# Patient Record
Sex: FEMALE
[De-identification: names, ages, dates, MRNs, and addresses within clinical notes are randomized per-mention and may not be internally consistent; named-entity substitution may affect disease eponyms.]

---

## 2023-03-29 ENCOUNTER — APPOINTMENT (OUTPATIENT)
Dept: OTOLARYNGOLOGY | Facility: CLINIC | Age: 23
End: 2023-03-29
Payer: COMMERCIAL

## 2023-03-29 VITALS — HEIGHT: 62 IN | WEIGHT: 130 LBS | BODY MASS INDEX: 23.92 KG/M2

## 2023-03-29 DIAGNOSIS — J31.0 CHRONIC RHINITIS: ICD-10-CM

## 2023-03-29 DIAGNOSIS — H69.81 OTHER SPECIFIED DISORDERS OF EUSTACHIAN TUBE, RIGHT EAR: ICD-10-CM

## 2023-03-29 DIAGNOSIS — K21.9 GASTRO-ESOPHAGEAL REFLUX DISEASE W/OUT ESOPHAGITIS: ICD-10-CM

## 2023-03-29 DIAGNOSIS — H93.8X1 OTHER SPECIFIED DISORDERS OF RIGHT EAR: ICD-10-CM

## 2023-03-29 DIAGNOSIS — H93.293 OTHER ABNORMAL AUDITORY PERCEPTIONS, BILATERAL: ICD-10-CM

## 2023-03-29 DIAGNOSIS — Z86.39 PERSONAL HISTORY OF OTHER ENDOCRINE, NUTRITIONAL AND METABOLIC DISEASE: ICD-10-CM

## 2023-03-29 PROCEDURE — 92557 COMPREHENSIVE HEARING TEST: CPT

## 2023-03-29 PROCEDURE — 99203 OFFICE O/P NEW LOW 30 MIN: CPT | Mod: 25

## 2023-03-29 PROCEDURE — 31231 NASAL ENDOSCOPY DX: CPT

## 2023-03-29 PROCEDURE — 92567 TYMPANOMETRY: CPT

## 2023-03-29 RX ORDER — ETONOGESTREL 68 MG/1
IMPLANT SUBCUTANEOUS
Refills: 0 | Status: ACTIVE | COMMUNITY

## 2023-03-29 RX ORDER — LEVOTHYROXINE SODIUM 200 UG/1
CAPSULE ORAL
Refills: 0 | Status: ACTIVE | COMMUNITY

## 2023-03-29 RX ORDER — PREDNISONE 10 MG/1
10 TABLET ORAL
Qty: 12 | Refills: 0 | Status: ACTIVE | COMMUNITY
Start: 2023-03-29 | End: 1900-01-01

## 2023-03-29 NOTE — ASSESSMENT
[FreeTextEntry1] : She has a 2 to 3-year history of right ear pressure and sensation eustachian tube dysfunction.  Her ears were normal on exam.  Audiogram was also normal.  Nasal endoscopy and flexible laryngoscopy showed mild nasal mucosal edema, deviated septum, and reflux related and no changes.  There were no suspicious lesions.  I discussed possible etiologies of right ear pressure including use of Q-tips, eustachian tube dysfunction, sinus disease, reflux, irritation from smoking, TMJ dysfunction, atypical migraines, and lesions.\par \par PLAN\par \par -findings and management options discussed in detail with the patient. \par -good aural hygiene\par -avoid using cotton swabs in the ears\par -wax removal drops as needed. \par -noise precautions\par -annual audiogram\par -She may try nasal steroid spray and decongestant or antihistamine again.  I spoke about trying a short burst of prednisone.  I reviewed some of the associated risks including the risk of mood changes, GI complications and bone issues/fractures.  She should take the medication with food and avoid exercise.  She does wish to try it\par -I recommend reflux precautions.  She was given literature.  She may try OTC Pepcid\par -She should avoid smoking\par -I have asked her to see her dentist to rule out TMJ dysfunction.\par -Depending how she feels when she returns, we may send her for CT scan.\par -follow up in 3 to 4 weeks\par -call and return earlier if any concerns.

## 2023-03-29 NOTE — HISTORY OF PRESENT ILLNESS
[de-identified] : TERESA MURRAY is a 23 year old patient with severe history of right eustachian tube dysfunction and ear pressure.  She also feels like she may have hearing loss.  There is no preceding event.  She has no otalgia, tinnitus, appreciated does have mild seasonal allergies.  She had sinus infections in the past.  She denies a known history of reflux.  She has no headaches.  She does not smoke tobacco but does smoke marijuana.  She does not have a history of TMJ dysfunction.  She did see 2 ENTs in the past.  She tried nasal steroid sprays and either decongestants or antihistamines without improvement.  She does not have a history of recurrent renal infections, otologic surgery, or excessive noise exposure.  Her brother did have myringotomy tubes when younger.  She has not had an audiogram

## 2023-04-24 PROBLEM — H93.293 ABNORMAL AUDITORY PERCEPTION OF BOTH EARS: Status: ACTIVE | Noted: 2023-04-24

## 2023-04-26 ENCOUNTER — APPOINTMENT (OUTPATIENT)
Dept: OTOLARYNGOLOGY | Facility: CLINIC | Age: 23
End: 2023-04-26